# Patient Record
Sex: FEMALE | Race: BLACK OR AFRICAN AMERICAN | NOT HISPANIC OR LATINO | URBAN - METROPOLITAN AREA
[De-identification: names, ages, dates, MRNs, and addresses within clinical notes are randomized per-mention and may not be internally consistent; named-entity substitution may affect disease eponyms.]

---

## 2024-10-02 ENCOUNTER — OFFICE VISIT (OUTPATIENT)
Dept: URBAN - METROPOLITAN AREA CLINIC 92 | Facility: CLINIC | Age: 39
End: 2024-10-02
Payer: COMMERCIAL

## 2024-10-02 ENCOUNTER — DASHBOARD ENCOUNTERS (OUTPATIENT)
Age: 39
End: 2024-10-02

## 2024-10-02 VITALS
WEIGHT: 165 LBS | DIASTOLIC BLOOD PRESSURE: 64 MMHG | TEMPERATURE: 97.7 F | BODY MASS INDEX: 28.17 KG/M2 | HEART RATE: 78 BPM | HEIGHT: 64 IN | SYSTOLIC BLOOD PRESSURE: 97 MMHG

## 2024-10-02 DIAGNOSIS — K62.5 RECTAL BLEEDING: ICD-10-CM

## 2024-10-02 DIAGNOSIS — K59.04 CHRONIC IDIOPATHIC CONSTIPATION: ICD-10-CM

## 2024-10-02 PROBLEM — 82934008: Status: ACTIVE | Noted: 2024-10-02

## 2024-10-02 PROCEDURE — 99203 OFFICE O/P NEW LOW 30 MIN: CPT

## 2024-10-02 RX ORDER — PROPYLTHIOURACIL 50 MG/1
1 TABLET TABLET ORAL TWICE A DAY
Status: ACTIVE | COMMUNITY

## 2024-10-02 RX ORDER — LINACLOTIDE 145 UG/1
1 CAPSULE AT LEAST 30 MINUTES BEFORE THE FIRST MEAL OF THE DAY ON AN EMPTY STOMACH CAPSULE, GELATIN COATED ORAL ONCE A DAY
Qty: 90 | Refills: 3 | OUTPATIENT
Start: 2024-10-02 | End: 2025-09-27

## 2024-10-02 NOTE — HPI-TODAY'S VISIT:
39yoF presents today for constipation and blood in the stool.  She has had constipation for a few years. Has tried to increase fiber but this doesn't help. She does liquid colon cleanses once a month. She can have BM daily or every few days. In the past few months has been noting blood in the stool and when she wipes. She does strain. She also has some occasional bloating if she has not had a BM in a few days. She has hyperthyroidism which was dx in 2020and she has been taking medication since. Labs with PCP in May normal per pt. She has f/u appt with endo next week. She has no fam h/o gi cancers or conditions.

## 2024-10-04 ENCOUNTER — TELEPHONE ENCOUNTER (OUTPATIENT)
Dept: URBAN - METROPOLITAN AREA CLINIC 92 | Facility: CLINIC | Age: 39
End: 2024-10-04

## 2024-10-25 ENCOUNTER — OFFICE VISIT (OUTPATIENT)
Dept: URBAN - METROPOLITAN AREA SURGERY CENTER 16 | Facility: SURGERY CENTER | Age: 39
End: 2024-10-25

## 2024-10-25 ENCOUNTER — CLAIMS CREATED FROM THE CLAIM WINDOW (OUTPATIENT)
Dept: URBAN - METROPOLITAN AREA SURGERY CENTER 16 | Facility: SURGERY CENTER | Age: 39
End: 2024-10-25
Payer: COMMERCIAL

## 2024-10-25 DIAGNOSIS — K92.1 HEMATOCHEZIA: ICD-10-CM

## 2024-10-25 DIAGNOSIS — K64.8 INTERNAL HEMORRHOIDS: ICD-10-CM

## 2024-10-25 DIAGNOSIS — K59.00 CONSTIPATION, UNSPECIFIED CONSTIPATION TYPE: ICD-10-CM

## 2024-10-25 PROCEDURE — 00811 ANES LWR INTST NDSC NOS: CPT | Performed by: ANESTHESIOLOGY

## 2024-10-25 PROCEDURE — 00811 ANES LWR INTST NDSC NOS: CPT | Performed by: ANESTHESIOLOGIST ASSISTANT

## 2024-11-19 ENCOUNTER — OFFICE VISIT (OUTPATIENT)
Dept: URBAN - METROPOLITAN AREA CLINIC 92 | Facility: CLINIC | Age: 39
End: 2024-11-19

## 2024-11-19 RX ORDER — LINACLOTIDE 145 UG/1
1 CAPSULE AT LEAST 30 MINUTES BEFORE THE FIRST MEAL OF THE DAY ON AN EMPTY STOMACH CAPSULE, GELATIN COATED ORAL ONCE A DAY
Qty: 90 | Refills: 3 | OUTPATIENT

## 2024-11-19 RX ORDER — LINACLOTIDE 145 UG/1
1 CAPSULE AT LEAST 30 MINUTES BEFORE THE FIRST MEAL OF THE DAY ON AN EMPTY STOMACH CAPSULE, GELATIN COATED ORAL ONCE A DAY
Qty: 90 | Refills: 3 | Status: ACTIVE | COMMUNITY
Start: 2024-10-02 | End: 2025-09-27

## 2024-11-19 RX ORDER — PROPYLTHIOURACIL 50 MG/1
1 TABLET TABLET ORAL TWICE A DAY
Status: ACTIVE | COMMUNITY

## 2024-11-19 NOTE — HPI-TODAY'S VISIT:
39yoF presents today for constipation and blood in the stool.  She has had constipation for a few years. Has tried to increase fiber but this doesn't help. She does liquid colon cleanses once a month. She can have BM daily or every few days. In the past few months has been noting blood in the stool and when she wipes. She does strain. She also has some occasional bloating if she has not had a BM in a few days. She has hyperthyroidism which was dx in 2020and she has been taking medication since. Labs with PCP in May normal per pt. She has f/u appt with endo next week. She has no fam h/o gi cancers or conditions.  Colonoscopy 10/25/24: [facility] ow normal.

## 2024-11-20 ENCOUNTER — OFFICE VISIT (OUTPATIENT)
Dept: URBAN - METROPOLITAN AREA CLINIC 92 | Facility: CLINIC | Age: 39
End: 2024-11-20
Payer: COMMERCIAL

## 2024-11-20 VITALS
BODY MASS INDEX: 29.19 KG/M2 | HEART RATE: 84 BPM | HEIGHT: 64 IN | TEMPERATURE: 97.4 F | WEIGHT: 171 LBS | DIASTOLIC BLOOD PRESSURE: 70 MMHG | SYSTOLIC BLOOD PRESSURE: 113 MMHG

## 2024-11-20 DIAGNOSIS — K59.04 CHRONIC IDIOPATHIC CONSTIPATION: ICD-10-CM

## 2024-11-20 DIAGNOSIS — K64.8 INTERNAL HEMORRHOIDS: ICD-10-CM

## 2024-11-20 PROCEDURE — 99213 OFFICE O/P EST LOW 20 MIN: CPT

## 2024-11-20 RX ORDER — LINACLOTIDE 145 UG/1
1 CAPSULE AT LEAST 30 MINUTES BEFORE THE FIRST MEAL OF THE DAY ON AN EMPTY STOMACH CAPSULE, GELATIN COATED ORAL ONCE A DAY
Qty: 90 | Refills: 3 | OUTPATIENT

## 2024-11-20 RX ORDER — LINACLOTIDE 145 UG/1
1 CAPSULE AT LEAST 30 MINUTES BEFORE THE FIRST MEAL OF THE DAY ON AN EMPTY STOMACH CAPSULE, GELATIN COATED ORAL ONCE A DAY
Qty: 90 | Refills: 3 | Status: ON HOLD | COMMUNITY

## 2024-11-20 RX ORDER — PROPYLTHIOURACIL 50 MG/1
1 TABLET TABLET ORAL TWICE A DAY
Status: ACTIVE | COMMUNITY

## 2024-11-20 NOTE — HPI-TODAY'S VISIT:
10/2/24 39yoF presents today for constipation and blood in the stool.  She has had constipation for a few years. Has tried to increase fiber but this doesn't help. She does liquid colon cleanses once a month. She can have BM daily or every few days. In the past few months has been noting blood in the stool and when she wipes. She does strain. She also has some occasional bloating if she has not had a BM in a few days. She has hyperthyroidism which was dx in 2020and she has been taking medication since. Labs with PCP in May normal per pt. She has f/u appt with endo next week. She has no fam h/o gi cancers or conditions.  11/20/24 Colonoscopy 10/25/24: [facility] ow normal.  Rectal bleeding resolved.  Felt much better after colon prep. SHe had issues with [facility] so never got the linzess 145mcg. She has been using samples and this helps.

## 2024-12-03 ENCOUNTER — OFFICE VISIT (OUTPATIENT)
Dept: URBAN - METROPOLITAN AREA CLINIC 92 | Facility: CLINIC | Age: 39
End: 2024-12-03

## 2024-12-12 ENCOUNTER — TELEPHONE ENCOUNTER (OUTPATIENT)
Dept: URBAN - METROPOLITAN AREA CLINIC 92 | Facility: CLINIC | Age: 39
End: 2024-12-12

## 2025-01-06 ENCOUNTER — OFFICE VISIT (OUTPATIENT)
Dept: URBAN - METROPOLITAN AREA CLINIC 92 | Facility: CLINIC | Age: 40
End: 2025-01-06

## 2025-01-06 ENCOUNTER — TELEPHONE ENCOUNTER (OUTPATIENT)
Dept: URBAN - METROPOLITAN AREA CLINIC 5 | Facility: CLINIC | Age: 40
End: 2025-01-06

## 2025-01-06 RX ORDER — PROPYLTHIOURACIL 50 MG/1
1 TABLET TABLET ORAL TWICE A DAY
Status: ACTIVE | COMMUNITY

## 2025-01-06 RX ORDER — LINACLOTIDE 145 UG/1
1 CAPSULE AT LEAST 30 MINUTES BEFORE THE FIRST MEAL OF THE DAY ON AN EMPTY STOMACH CAPSULE, GELATIN COATED ORAL ONCE A DAY
Qty: 90 | Refills: 3 | Status: ACTIVE | COMMUNITY

## 2025-01-06 RX ORDER — LINACLOTIDE 145 UG/1
1 CAPSULE AT LEAST 30 MINUTES BEFORE THE FIRST MEAL OF THE DAY ON AN EMPTY STOMACH CAPSULE, GELATIN COATED ORAL ONCE A DAY
Qty: 90 | Refills: 3 | OUTPATIENT